# Patient Record
Sex: MALE | Race: BLACK OR AFRICAN AMERICAN
[De-identification: names, ages, dates, MRNs, and addresses within clinical notes are randomized per-mention and may not be internally consistent; named-entity substitution may affect disease eponyms.]

---

## 2018-05-20 ENCOUNTER — HOSPITAL ENCOUNTER (EMERGENCY)
Dept: HOSPITAL 97 - ER | Age: 63
Discharge: HOME | End: 2018-05-20
Payer: SELF-PAY

## 2018-05-20 DIAGNOSIS — E11.9: ICD-10-CM

## 2018-05-20 DIAGNOSIS — M47.892: Primary | ICD-10-CM

## 2018-05-20 PROCEDURE — 99284 EMERGENCY DEPT VISIT MOD MDM: CPT

## 2018-05-20 PROCEDURE — 72125 CT NECK SPINE W/O DYE: CPT

## 2018-05-20 NOTE — ER
Nurse's Notes                                                                                     

 [facility]                                                                

Name: Viraj Elder                                                                                  

Age: 62 yrs                                                                                       

Sex: Male                                                                                         

: 1955                                                                                   

MRN: [id number]                                                                                   

Arrival Date: 2018                                                                          

Time: 18:55                                                                                       

Account#: [id number]                                                                            

Bed 15                                                                                            

Private MD:                                                                                       

Diagnosis: Cervical spondylosis                                                                   

                                                                                                  

Presentation:                                                                                     

                                                                                             

18:58 Presenting complaint: Patient states: neck pain since Friday, no injury or fever.       la1 

      Transition of care: patient was not received from another setting of care. Acute            

      neurological deficit: none identified. Onset of symptoms was May 20, 2018. Initial          

      Sepsis Screen: Does the patient meet any 2 criteria? No. Patient's initial sepsis           

      screen is negative. Does the patient have a suspected source of infection? No.              

      Patient's initial sepsis screen is negative. Care prior to arrival: None.                   

18:58 Method Of Arrival: Ambulatory                                                           la1 

18:58 Acuity: STANLEY 3                                                                           la1 

                                                                                                  

Historical:                                                                                       

- Allergies:                                                                                      

18:59 No Known Allergies;                                                                     la1 

- PMHx:                                                                                           

18:59 Diabetes - NIDDM;                                                                       la1 

                                                                                                  

- Immunization history:: Adult Immunizations up to date.                                          

- Social history:: Smoking status: Patient/guardian denies using tobacco.                         

                                                                                                  

                                                                                                  

Screenin:29 Abuse screen: Denies threats or abuse. Denies injuries from another. Nutritional        mg2 

      screening: No deficits noted. Tuberculosis screening: No symptoms or risk factors           

      identified. Fall Risk None identified.                                                      

                                                                                                  

Assessment:                                                                                       

19:27 General: Appears in no apparent distress. comfortable, Behavior is calm, cooperative.   mg2 

      Pain: Complains of pain in occipitus/head Pain radiates to right arm Pain currently is      

      6 out of 10 on a pain scale. Quality of pain is described as aching, Pain began 2-3         

      days ago. Is intermittent, Alleviated by rest. Neuro: Level of Consciousness is awake,      

      alert, obeys commands, Oriented to person, place, time. Cardiovascular: Capillary           

      refill < 3 seconds Patient's skin is warm and dry. Respiratory: Airway is patent            

      Respiratory effort is even, unlabored, Respiratory pattern is regular, symmetrical. GI:     

      No signs and/or symptoms were reported involving the gastrointestinal system. : No        

      signs and/or symptoms were reported regarding the genitourinary system. EENT: No signs      

      and/or symptoms were reported regarding the EENT system. Derm: Skin is intact, Skin is      

      pink, warm \T\ dry. normal. Musculoskeletal: Reports numbness in right arm.                 

19:33 Reassessment: patient is back from ct scan.                                             mg2 

                                                                                                  

Vital Signs:                                                                                      

18:59  / 92; Pulse 84; Resp 16; Temp 97.2; Pulse Ox 100% on R/A; Weight 89.36 kg;       la1 

      Height 6 ft. 1 in. (185.42 cm);                                                             

19:53  / 85; Pulse 80; Resp 18; Pulse Ox 98% on R/A; Pain 5/10;                         mg2 

18:59 Body Mass Index 25.99 (89.36 kg, 185.42 cm)                                             la1 

                                                                                                  

ED Course:                                                                                        

18:55 Patient arrived in ED.                                                                  sb2 

18:59 Triage completed.                                                                       la1 

18:59 Arm band placed on right wrist.                                                         la1 

19:04 Juan Singleton MD is Attending Physician.                                                    pkl 

19:13 Sharif Jaime, RN is Primary Nurse.                                                  mg2 

19:28 CT C Spine In Process Unspecified.                                                      EDMS

19:28 CT completed. Patient tolerated procedure well. Patient moved back from CT.             bq  

19:30 Patient has correct armband on for positive identification. Placed in gown. Bed in low  mg2 

      position. Call light in reach. Side rails up X 1.                                           

20:24 No provider procedures requiring assistance completed. Patient did not have IV access   mg2 

      during this emergency room visit.                                                           

                                                                                                  

Administered Medications:                                                                         

19:17 Drug: UltRAM 50 mg Route: PO;                                                           mg2 

20:23 Follow up: Response: No adverse reaction; Pain is decreased                             mg2 

                                                                                                  

                                                                                                  

Outcome:                                                                                          

20:08 Discharge ordered by MD.                                                                pkl 

20:24 Discharged to home ambulatory, with family.                                             mg2 

20:24 Condition: good                                                                             

20:24 Discharge instructions given to patient, family, Instructed on discharge instructions,      

      follow up and referral plans. medication usage, Demonstrated understanding of               

      instructions, follow-up care, medications, Prescriptions given X 1.                         

20:24 Patient left the ED.                                                                    mg2 

                                                                                                  

Signatures:                                                                                       

Dispatcher [employer]                           EDMS                                                 

Juan Singleton MD MD pkl Quilty, Betty bq Attema, Lee, RN RN   la1                                                  

Lori Robert                               sb2                                                  

Sharif Jaime, SURAJ                    RN   mg2                                                  

                                                                                                  

**************************************************************************************************

## 2018-05-20 NOTE — EDPHYS
Physician Documentation                                                                           

 [facility]                                                                

Name: Viraj Elder                                                                                  

Age: 62 yrs                                                                                       

Sex: Male                                                                                         

: 1955                                                                                   

MRN: [id number]                                                                                   

Arrival Date: 2018                                                                          

Time: 18:55                                                                                       

Account#: [id number]                                                                            

Bed 15                                                                                            

Private MD:                                                                                       

ED Physician Juan Singleton                                                                             

HPI:                                                                                              

                                                                                             

19:12 This 62 yrs old Black Male presents to ER via Ambulatory with complaints of Neck Pain,  pkl 

      >24Hrs Old.                                                                                 

19:12 The patient or guardian complains of pain, that is acute. The symptoms are located at   pkl 

      the back of neck. Onset: The symptoms/episode began/occurred 2 day(s) ago. Associated       

      signs and symptoms: The patient has no apparent associated signs or symptoms.               

                                                                                                  

Historical:                                                                                       

- Allergies:                                                                                      

18:59 No Known Allergies;                                                                     la1 

- PMHx:                                                                                           

18:59 Diabetes - NIDDM;                                                                       la1 

                                                                                                  

- Immunization history:: Adult Immunizations up to date.                                          

- Social history:: Smoking status: Patient/guardian denies using tobacco.                         

                                                                                                  

                                                                                                  

ROS:                                                                                              

19:12 Eyes: Negative for injury, pain, redness, and discharge, ENT: Negative for injury,      pkl 

      pain, and discharge.                                                                        

19:12 Neck: Positive for pain with movement.                                                      

19:12 Cardiovascular: Negative for chest pain.                                                    

19:12 Respiratory: Negative for cough, shortness of breath.                                       

19:12 Abdomen/GI: Negative for abdominal pain, nausea, vomiting, and diarrhea.                    

19:12 Back: Negative for acute changes.                                                           

19:12 : Negative for urinary symptoms.                                                          

19:12 MS/extremity: Negative for acute changes.                                                   

19:12 Skin: Negative for rash.                                                                    

19:12 Neuro: Positive for numbness, pain  right  arm.                                             

                                                                                                  

Exam:                                                                                             

19:12 Head/Face:  Normocephalic, atraumatic. Eyes:  Pupils equal round and reactive to light, pkl 

      extra-ocular motions intact.  Lids and lashes normal.  Conjunctiva and sclera are           

      non-icteric and not injected.  Cornea within normal limits.  Periorbital areas with no      

      swelling, redness, or edema. ENT:  Nares patent. No nasal discharge, no septal              

      abnormalities noted.  Tympanic membranes are normal and external auditory canals are        

      clear.  Oropharynx with no redness, swelling, or masses, exudates, or evidence of           

      obstruction, uvula midline.  Mucous membranes moist.                                        

19:12 Neck: ROM/movement: pain, that is moderate, with rotation to the right, abduction           

      right  upper  extremity  .                                                                  

19:12 Chest/axilla: Exam negative for acute changes.                                              

19:12 Cardiovascular: Rate: normal, Rhythm: regular.                                              

19:12 Respiratory: the patient does not display signs of respiratory distress,  Respirations:     

      normal, Breath sounds: are clear throughout.                                                

19:12 Abdomen/GI: Bowel sounds: normal, Palpation: abdomen is soft and non-tender, in all         

      quadrants.                                                                                  

19:12 Back: Exam negative for acute changes.                                                      

19:12 : Exam negative for acute changes.                                                        

19:12 Musculoskeletal/extremity: Exam is negative for acute changes.                              

19:12 Skin: Exam negative for rash.                                                               

19:12 Neuro: Orientation: is normal, Mentation: is normal, Cranial nerves: grossly normal,        

      Motor: is normal.                                                                           

                                                                                                  

Vital Signs:                                                                                      

18:59  / 92; Pulse 84; Resp 16; Temp 97.2; Pulse Ox 100% on R/A; Weight 89.36 kg;       la1 

      Height 6 ft. 1 in. (185.42 cm);                                                             

19:53  / 85; Pulse 80; Resp 18; Pulse Ox 98% on R/A; Pain 5/10;                         mg2 

18:59 Body Mass Index 25.99 (89.36 kg, 185.42 cm)                                             la1 

                                                                                                  

MDM:                                                                                              

19:04 Patient medically screened.                                                             pkl 

20:06 Data reviewed: vital signs, nurses notes, radiologic studies, CT scan. ED course:       pkl 

      Advised MRI C-Spines if symptoms persist.                                                   

                                                                                                  

                                                                                             

19:12 Order name: CT C Spine; Complete Time: 20:03                                            pkl 

                                                                                                  

Administered Medications:                                                                         

19:17 Drug: UltRAM 50 mg Route: PO;                                                           mg2 

20:23 Follow up: Response: No adverse reaction; Pain is decreased                             mg2 

                                                                                                  

                                                                                                  

Disposition:                                                                                      

18 20:08 Discharged to Home. Impression: Cervical spondylosis.                              

- Condition is Stable.                                                                            

                                                                                                  

- Prescriptions for Ultracet 37.5- 325 mg Oral Tablet - take 1 tablet by ORAL route               

  every 6 hours - for up to 5 days; do not exceed 8 tablets per day.; 30 tablet.                  

- Medication Reconciliation Form, Thank You Letter, Antibiotic Education, Prescription            

  Opioid Use form.                                                                                

- Follow up: Private Physician; When: 2 - 3 days; Reason: Re-evaluation by your                   

  physician.                                                                                      

- Problem is new.                                                                                 

- Symptoms are unchanged.                                                                         

                                                                                                  

                                                                                                  

                                                                                                  

Signatures:                                                                                       

Dispatcher [employer]                           EDMS                                                 

Juan Singleton MD MD   pkl                                                  

Triston Chang RN                         RN   la1                                                  

Sharif Jaime RN                    RN   mg2                                                  

                                                                                                  

Corrections: (The following items were deleted from the chart)                                    

20:24 20:08 2018 20:08 Discharged to Home. Impression: Cervical spondylosis. Condition  mg2 

      is Stable. Forms are Medication Reconciliation Form, Thank You Letter, Antibiotic           

      Education, Prescription Opioid Use. Follow up: Private Physician; When: 2 - 3 days;         

      Reason: Re-evaluation by your physician. Problem is new. Symptoms are unchanged. pkl        

                                                                                                  

**************************************************************************************************

## 2018-05-20 NOTE — RAD REPORT
EXAM DESCRIPTION:  CT - C Spine Wo Con - 5/20/2018 7:27 pm

 

CLINICAL HISTORY:  Radiculopathy and neck pain

 

COMPARISON:  None.

 

TECHNIQUE:  Computed axial tomography of the cervical spine were obtained with sagittal and coronal r
econstruction images generated and reviewed.

 

All CT scans are performed using dose optimization technique as appropriate and may include automated
 exposure control or mA/KV adjustment according to patient size.

 

FINDINGS:  A cervical fracture is not seen. No dislocation is noted

 

Spondylosis is most marked at C5-6. This consists of disc space narrowing, osteophytes and disc bulge
. The thecal sac is mildly narrowed. Mild to moderate narrowing of the left and mild narrowing the ri
ght neural foramina is seen.

 

IMPRESSION:  A cervical fracture is not seen.

 

Spondylosis resulting in moderate left foraminal stenosis at C5-6.

 

If the patient continues have symptoms to suggest spinal cord/spinal canal pathology then MRI would b
e recommended.

## 2019-10-09 ENCOUNTER — HOSPITAL ENCOUNTER (EMERGENCY)
Dept: HOSPITAL 97 - ER | Age: 64
Discharge: HOME | End: 2019-10-09
Payer: SELF-PAY
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
## 2019-10-09 DIAGNOSIS — S46.911A: Primary | ICD-10-CM

## 2019-10-09 PROCEDURE — 99284 EMERGENCY DEPT VISIT MOD MDM: CPT

## 2019-10-09 NOTE — ER
Nurse's Notes                                                                                     

 [facility]                                                                 

Name: Viraj Elder                                                                                  

Age: 64 yrs                                                                                       

Sex: Male                                                                                         

: 1955                                                                                   

MRN: [id number]                                                                                   

Arrival Date: 10/09/2019                                                                          

Time: 17:13                                                                                       

Account#: [id number]                                                                            

Bed 8                                                                                             

Private MD:                                                                                       

Diagnosis: Strain of other muscles, fascia and tendons at shoulder and upper arm level, right arm 

                                                                                                  

Presentation:                                                                                     

10/09                                                                                             

17:23 Presenting complaint: Patient states: right shoulder pain that began 2-3 days ago. Pt   aa5 

      denies known injury. Pt denies SOB, denies nausea. Transition of care: patient was not      

      received from another setting of care. Onset of symptoms was 2019. Risk             

      Assessment: Do you want to hurt yourself or someone else? Patient reports no desire to      

      harm self or others. Initial Sepsis Screen: Does the patient meet any 2 criteria? No.       

      Patient's initial sepsis screen is negative. Does the patient have a suspected source       

      of infection? No. Patient's initial sepsis screen is negative. Care prior to arrival:       

      None.                                                                                       

17:23 Acuity: STANLEY 4                                                                           aa5 

17:23 Method Of Arrival: Ambulatory                                                           aa5 

                                                                                                  

Historical:                                                                                       

- Allergies:                                                                                      

17:24 No Known Allergies;                                                                     aa5 

- PMHx:                                                                                           

17:24 Diabetes - NIDDM; Hypertension;                                                         aa5 

- PSHx:                                                                                           

17:24 None;                                                                                   aa5 

                                                                                                  

- Immunization history:: Flu vaccine is not up to date.                                           

- Social history:: Smoking status: Patient/guardian denies using tobacco.                         

- Ebola Screening: : No symptoms or risks identified at this time.                                

                                                                                                  

                                                                                                  

Screenin:26 Abuse screen: Denies threats or abuse. Nutritional screening: No deficits noted.        tw2 

      Tuberculosis screening: No symptoms or risk factors identified. Fall Risk None              

      identified.                                                                                 

                                                                                                  

Assessment:                                                                                       

17:49 General: Appears in no apparent distress. comfortable, Behavior is calm, cooperative,   ch  

      appropriate for age. Pain: Complains of pain in right shoulder Pain currently is 6 out      

      of 10 on a pain scale. Respiratory: No deficits noted. Derm: Skin is pink, warm \T\ dry.    

                                                                                                  

Vital Signs:                                                                                      

17:24  / 94; Pulse 89; Resp 18 S; Temp 98.0(TE); Pulse Ox 98% on R/A; Weight 79.38 kg   aa5 

      (R); Height 6 ft. 1 in. (185.42 cm) (R); Pain 7/10;                                         

17:24 Body Mass Index 23.09 (79.38 kg, 185.42 cm)                                             aa5 

                                                                                                  

ED Course:                                                                                        

17:13 Patient arrived in ED.                                                                  as  

17:23 Arm band placed on.                                                                     aa5 

17:24 Triage completed.                                                                       aa5 

17:26 Anila Bond, RN is Primary Nurse.                                                        tw2 

17:26 Bed in low position. Cardiac monitor on. Pulse ox on. NIBP on.                          tw2 

17:33 Renetta Gaston FNP-C is PHCP.                                                        snw 

17:33 Cas Montes MD is Attending Physician.                                             snw 

17:38 PHCP role handed off by Renetta Gaston FNP-C                                         [facility] 

17:38 Micheal Umaña PA is PHCP.                                                               [facility] 

17:49 No apparent distress. Resting quietly.                                                    

17:49 No provider procedures requiring assistance completed. Patient did not have IV access   ch  

      during this emergency room visit.                                                           

                                                                                                  

Administered Medications:                                                                         

No medications were administered                                                                  

                                                                                                  

                                                                                                  

Outcome:                                                                                          

17:39 Discharge ordered by MD.                                                                [facility] 

17:50 Discharged to home ambulatory, with family.                                               

17:50 Condition: stable                                                                           

17:50 Discharge instructions given to patient, family, Instructed on discharge instructions,      

      follow up and referral plans. medication usage, Demonstrated understanding of               

      instructions, follow-up care, medications.                                                  

17:51 Patient left the ED.                                                                    ch  

                                                                                                  

Signatures:                                                                                       

Annabelle Garces, RN                  RN                                                      

Renetta Gaston FNP-C FNP-Lita Bronson Audri, RN RN   [facility]                                                  

Micheal Umaña PA PA   [facility]                                                  

Anila Bond RN                          RN   2                                                  

                                                                                                  

**************************************************************************************************

## 2019-10-09 NOTE — EDPHYS
Physician Documentation                                                                           

 [facility]                                                                 

Name: Viraj Elder                                                                                  

Age: 64 yrs                                                                                       

Sex: Male                                                                                         

: 1955                                                                                   

MRN: [id number]                                                                                   

Arrival Date: 10/09/2019                                                                          

Time: 17:13                                                                                       

Account#: [id number]                                                                            

Bed 8                                                                                             

Private MD:                                                                                       

ED Physician Cas Montes                                                                      

HPI:                                                                                              

10/09                                                                                             

17:40 This 64 yrs old Black Male presents to ER via Ambulatory with complaints of Shoulder    jr8 

      Pain.                                                                                       

17:40 The patient or guardian complains of pain. right shoulder. Onset: The symptoms/episode  jr8 

      began/occurred gradually, 2 day(s) ago. Modifying factors: the symptoms are alleviated      

      by remaining still, The symptoms are aggravated by movement. Associated signs and           

      symptoms: The patient has no apparent associated signs or symptoms. Severity of             

      symptoms: At their worst the symptoms were mild, in the emergency department the            

      symptoms are unchanged. The patient has not experienced similar symptoms in the past.       

      The patient has not recently seen a physician. Stated that he is a  for a          

      school. Utilizes arms a lot. Started to have pain to middle of right arm with abduction     

      only. Denies trauma .                                                                       

                                                                                                  

Historical:                                                                                       

- Allergies:                                                                                      

17:24 No Known Allergies;                                                                     aa5 

- PMHx:                                                                                           

17:24 Diabetes - NIDDM; Hypertension;                                                         aa5 

- PSHx:                                                                                           

17:24 None;                                                                                   aa5 

                                                                                                  

- Immunization history:: Flu vaccine is not up to date.                                           

- Social history:: Smoking status: Patient/guardian denies using tobacco.                         

- Ebola Screening: : No symptoms or risks identified at this time.                                

                                                                                                  

                                                                                                  

ROS:                                                                                              

17:40 Eyes: Negative for injury, pain, redness, and discharge, ENT: Negative for injury,      jr8 

      pain, and discharge, Neck: Negative for injury, pain, and swelling, Cardiovascular:         

      Negative for chest pain, palpitations, and edema, Respiratory: Negative for shortness       

      of breath, cough, wheezing, and pleuritic chest pain, Abdomen/GI: Negative for              

      abdominal pain, nausea, vomiting, diarrhea, and constipation, Back: Negative for injury     

      and pain, Skin: Negative for injury, rash, and discoloration, Neuro: Negative for           

      headache, weakness, numbness, tingling, and seizure.                                        

17:40 MS/extremity: Positive for pain.                                                            

                                                                                                  

Exam:                                                                                             

17:40 Constitutional:  This is a well developed, well nourished patient who is awake, alert,  jr8 

      and in no acute distress. Cardiovascular:  Regular rate and rhythm with a normal S1 and     

      S2.  No gallops, murmurs, or rubs.  Normal PMI, no JVD.  No pulse deficits.                 

      Respiratory:  Lungs have equal breath sounds bilaterally, clear to auscultation and         

      percussion.  No rales, rhonchi or wheezes noted.  No increased work of breathing, no        

      retractions or nasal flaring. Back:  No spinal tenderness.  No costovertebral               

      tenderness.  Full range of motion. Skin:  Warm, dry with normal turgor.  Normal color       

      with no rashes, no lesions, and no evidence of cellulitis. Neuro:  Awake and alert, GCS     

      15, oriented to person, place, time, and situation.  Cranial nerves II-XII grossly          

      intact.  Motor strength 5/5 in all extremities.  Sensory grossly intact.  Cerebellar        

      exam normal.  Normal gait.                                                                  

17:40 Musculoskeletal/extremity: Extremities: grossly normal except: noted in the right           

      shoulder: Mild point tenderness near insertion site of right deltoid. Full ROM without      

      difficulty. No bicipital tendon impairment noted. Rotator cuff intact and without           

      deficit. Rest of extremity exam unremarkable , ROM: intact in all extremities,              

      Circulation is intact in all extremities. Sensation intact.                                 

                                                                                                  

Vital Signs:                                                                                      

17:24  / 94; Pulse 89; Resp 18 S; Temp 98.0(TE); Pulse Ox 98% on R/A; Weight 79.38 kg   aa5 

      (R); Height 6 ft. 1 in. (185.42 cm) (R); Pain 7/10;                                         

17:24 Body Mass Index 23.09 (79.38 kg, 185.42 cm)                                             aa5 

                                                                                                  

MDM:                                                                                              

17:37 Patient medically screened.                                                             [facility] 

17:38 Data reviewed: vital signs, nurses notes, and as a result, I will discharge patient.    8 

      Data interpreted: Pulse oximetry: on room air is 98 %. Interpretation: normal.              

      Counseling: I had a detailed discussion with the patient and/or guardian regarding: the     

      historical points, exam findings, and any diagnostic results supporting the                 

      discharge/admit diagnosis, the need for outpatient follow up, a family practitioner, to     

      return to the emergency department if symptoms worsen or persist or if there are any        

      questions or concerns that arise at home.                                                   

                                                                                                  

Administered Medications:                                                                         

No medications were administered                                                                  

                                                                                                  

                                                                                                  

Disposition:                                                                                      

10/10                                                                                             

07:38 Co-signature as Attending Physician, Cas Montes MD I agree with the assessment and  [facility] 

      plan of care.                                                                               

                                                                                                  

Disposition:                                                                                      

10/09/19 17:39 Discharged to Home. Impression: Strain of other muscles, fascia and tendons at     

  shoulder and upper arm level, right arm.                                                        

- Condition is Stable.                                                                            

- Discharge Instructions: Muscle Strain, Muscle Pain, Adult.                                      

- Prescriptions for Mobic 7.5 mg Oral Tablet - take 1 tablet by ORAL route once daily             

  take with food; 20 tablet.                                                                      

- Work release form, Medication Reconciliation Form, Thank You Letter, Antibiotic                 

  Education, Prescription Opioid Use form.                                                        

- Follow up: Private Physician; When: 1 week; Reason: Recheck today's complaints,                 

  Continuance of care, Re-evaluation by your physician.                                           

- Problem is new.                                                                                 

- Symptoms have improved.                                                                         

                                                                                                  

                                                                                                  

                                                                                                  

Signatures:                                                                                       

Annabelle Garces RN                  RN   Cas iVdes MD MD cha Calderon, Audri, RN                     RN   aa5                                                  

Micheal Umaña PA PA   jr8                                                  

                                                                                                  

Corrections: (The following items were deleted from the chart)                                    

10/09                                                                                             

17:51 17:39 10/09/2019 17:39 Discharged to Home. Impression: Strain of other muscles, fascia  ch  

      and tendons at shoulder and upper arm level, right arm. Condition is Stable. Forms are      

      Medication Reconciliation Form, Thank You Letter, Antibiotic Education, Prescription        

      Opioid Use. Follow up: Private Physician; When: 1 week; Reason: Recheck today's             

      complaints, Continuance of care, Re-evaluation by your physician. Problem is new.           

      Symptoms have improved. jr8                                                                 

                                                                                                  

**************************************************************************************************